# Patient Record
Sex: FEMALE | Race: WHITE | HISPANIC OR LATINO | URBAN - METROPOLITAN AREA
[De-identification: names, ages, dates, MRNs, and addresses within clinical notes are randomized per-mention and may not be internally consistent; named-entity substitution may affect disease eponyms.]

---

## 2017-01-25 ENCOUNTER — OFFICE (OUTPATIENT)
Dept: URBAN - METROPOLITAN AREA CLINIC 11 | Facility: CLINIC | Age: 37
End: 2017-01-25

## 2017-01-25 VITALS
WEIGHT: 158 LBS | DIASTOLIC BLOOD PRESSURE: 66 MMHG | HEIGHT: 65 IN | HEART RATE: 60 BPM | SYSTOLIC BLOOD PRESSURE: 122 MMHG

## 2017-01-25 DIAGNOSIS — R10.11 RIGHT UPPER QUADRANT PAIN: ICD-10-CM

## 2017-01-25 DIAGNOSIS — R11.0 NAUSEA: ICD-10-CM

## 2017-01-25 DIAGNOSIS — K80.20 CALCULUS OF GALLBLADDER WITHOUT CHOLECYSTITIS WITHOUT OBSTRU: ICD-10-CM

## 2017-01-25 DIAGNOSIS — E66.3 OVERWEIGHT: ICD-10-CM

## 2017-01-25 DIAGNOSIS — R11.10 VOMITING, UNSPECIFIED: ICD-10-CM

## 2017-01-25 DIAGNOSIS — R14.0 ABDOMINAL DISTENSION (GASEOUS): ICD-10-CM

## 2017-01-25 PROCEDURE — 78267 UREA BREATH TST C-14 ACQUISJ: CPT | Performed by: INTERNAL MEDICINE

## 2017-01-25 PROCEDURE — 99203 OFFICE O/P NEW LOW 30 MIN: CPT | Performed by: INTERNAL MEDICINE

## 2017-01-25 RX ORDER — HYOSCYAMINE SULFATE 0.12 MG/1
TABLET ORAL; SUBLINGUAL
Qty: 60 | Refills: 3 | Status: ACTIVE
Start: 2017-01-25

## 2017-01-25 NOTE — SERVICENOTES
The patient has episodes of sharp right upper quadrant pain that radiates to her right flank.  While the differential could include peptic ulcer disease, gastritis, IBS, H. pylori, etc. given her imaging and description of her pain symptoms biliary colic is most likely etiology.  It is worrisome that her symptoms are becoming more frequent and more severe and because of this I did recommend to the patient, while she is not very enthusiastic about the idea of surgery, that she undergo elective cholecystectomy otherwise she may risk getting cholangitis or cholecystitis and requiring an urgent cholecystectomy.  I explained to her that ursodiol does not really work to prevent gallstone attacks.  I did offer evaluation of other etiologies of upper abdominal pain suggestive EGD and H. pylori breath test.  She is not interested in EGD that she would be okay with the H. pylori breath test.  She also is not interested in a trial of PPI this time.  I did give her a short course of anti-spasmodic to see if this may help her symptoms, however I do think surgery is most likely in her best interest and will hopefully bring her relief.  She was told to notify us if after cholecystectomy her symptoms persist and we can undergo further evaluation.

## 2017-01-25 NOTE — SERVICEHPINOTES
Ms. Holt is a 36-year-old female here for evaluation of right upper quadrant pain. She states her symptoms started about 1-1/2 month ago. Of note, she gave birth to a child about four months ago. She states that about that time she went to the emergency department at [facility] where she underwent imaging that revealed gallstones and apparently also met with the surgeon but at that time she is not interested in surgery. She states that since then she has no episode of right upper quadrant pain associated with nausea and vomiting about once a week. It usually occurs at night. It had been lasting only about 1-2 hours but her most recent episodes have lasted up to 4-5 hours and have been more severe. The pain is sharp and radiated 10 out of 10. It is located in her right upper quadrant and radiates to her right flank. It is not normally associated with food note some mild discomfort whenever she eats food. She has recently been on low-fat diet. She does have some occasional reflux and also some associated bloating with her symptoms as well. She denies any fevers, chills, diarrhea, constipation, or jaundice.

## 2017-01-26 LAB
H PYLORI BREATH TEST: 13.8 % — HIGH
HEPATIC FUNCTION PANEL A: ALBUMIN: 4.7 G/DL (ref 3.5–5.2)
HEPATIC FUNCTION PANEL A: ALKALINE PHOSPHATASE: 81 U/L (ref 34–115)
HEPATIC FUNCTION PANEL A: DIRECT BILIRUBIN: <0.1 MG/DL
HEPATIC FUNCTION PANEL A: SGOT (AST): 14 U/L (ref 13–40)
HEPATIC FUNCTION PANEL A: SGPT (ALT): 21 U/L (ref 7–52)
HEPATIC FUNCTION PANEL A: TOTAL BILIRUBIN: 0.2 MG/DL — LOW (ref 0.3–1.2)
HEPATIC FUNCTION PANEL A: TOTAL PROTEIN: 7.4 G/DL (ref 6.4–8.3)

## 2018-02-19 ENCOUNTER — OFFICE (OUTPATIENT)
Dept: URBAN - METROPOLITAN AREA PATHOLOGY 22 | Facility: PATHOLOGY | Age: 38
End: 2018-02-19
Payer: COMMERCIAL

## 2018-02-19 ENCOUNTER — AMBULATORY SURGICAL CENTER (OUTPATIENT)
Dept: URBAN - METROPOLITAN AREA SURGERY 3 | Facility: SURGERY | Age: 38
End: 2018-02-19

## 2018-02-19 VITALS
DIASTOLIC BLOOD PRESSURE: 77 MMHG | RESPIRATION RATE: 16 BRPM | RESPIRATION RATE: 16 BRPM | TEMPERATURE: 98 F | DIASTOLIC BLOOD PRESSURE: 75 MMHG | DIASTOLIC BLOOD PRESSURE: 77 MMHG | SYSTOLIC BLOOD PRESSURE: 110 MMHG | RESPIRATION RATE: 18 BRPM | OXYGEN SATURATION: 99 % | HEART RATE: 94 BPM | SYSTOLIC BLOOD PRESSURE: 132 MMHG | OXYGEN SATURATION: 99 % | SYSTOLIC BLOOD PRESSURE: 98 MMHG | DIASTOLIC BLOOD PRESSURE: 62 MMHG | SYSTOLIC BLOOD PRESSURE: 98 MMHG | DIASTOLIC BLOOD PRESSURE: 80 MMHG | DIASTOLIC BLOOD PRESSURE: 80 MMHG | RESPIRATION RATE: 18 BRPM | HEART RATE: 79 BPM | OXYGEN SATURATION: 100 % | DIASTOLIC BLOOD PRESSURE: 75 MMHG | HEART RATE: 89 BPM | SYSTOLIC BLOOD PRESSURE: 132 MMHG | HEIGHT: 65 IN | RESPIRATION RATE: 20 BRPM | TEMPERATURE: 97 F | OXYGEN SATURATION: 100 % | WEIGHT: 145 LBS | HEART RATE: 89 BPM | TEMPERATURE: 97 F | WEIGHT: 145 LBS | SYSTOLIC BLOOD PRESSURE: 121 MMHG | SYSTOLIC BLOOD PRESSURE: 110 MMHG | TEMPERATURE: 98 F | HEART RATE: 79 BPM | HEART RATE: 77 BPM | HEART RATE: 94 BPM | HEART RATE: 77 BPM | HEIGHT: 65 IN | SYSTOLIC BLOOD PRESSURE: 129 MMHG | DIASTOLIC BLOOD PRESSURE: 66 MMHG | DIASTOLIC BLOOD PRESSURE: 66 MMHG | SYSTOLIC BLOOD PRESSURE: 129 MMHG | DIASTOLIC BLOOD PRESSURE: 62 MMHG | SYSTOLIC BLOOD PRESSURE: 121 MMHG | RESPIRATION RATE: 20 BRPM

## 2018-02-19 DIAGNOSIS — R10.11 RIGHT UPPER QUADRANT PAIN: ICD-10-CM

## 2018-02-19 DIAGNOSIS — K22.8 OTHER SPECIFIED DISEASES OF ESOPHAGUS: ICD-10-CM

## 2018-02-19 DIAGNOSIS — K29.50 UNSPECIFIED CHRONIC GASTRITIS WITHOUT BLEEDING: ICD-10-CM

## 2018-02-19 DIAGNOSIS — B96.81 HELICOBACTER PYLORI [H. PYLORI] AS THE CAUSE OF DISEASES CLA: ICD-10-CM

## 2018-02-19 DIAGNOSIS — K44.9 DIAPHRAGMATIC HERNIA WITHOUT OBSTRUCTION OR GANGRENE: ICD-10-CM

## 2018-02-19 PROBLEM — D13.0 BENIGN NEOPLASM OF ESOPHAGUS: Status: ACTIVE | Noted: 2018-02-19

## 2018-02-19 PROCEDURE — 88313 SPECIAL STAINS GROUP 2: CPT | Performed by: INTERNAL MEDICINE

## 2018-02-19 PROCEDURE — 43239 EGD BIOPSY SINGLE/MULTIPLE: CPT | Performed by: INTERNAL MEDICINE

## 2018-02-19 PROCEDURE — 88312 SPECIAL STAINS GROUP 1: CPT | Performed by: INTERNAL MEDICINE

## 2018-02-19 PROCEDURE — 88342 IMHCHEM/IMCYTCHM 1ST ANTB: CPT | Performed by: INTERNAL MEDICINE

## 2018-02-19 PROCEDURE — 88305 TISSUE EXAM BY PATHOLOGIST: CPT | Performed by: INTERNAL MEDICINE
